# Patient Record
Sex: FEMALE | Race: WHITE | URBAN - METROPOLITAN AREA
[De-identification: names, ages, dates, MRNs, and addresses within clinical notes are randomized per-mention and may not be internally consistent; named-entity substitution may affect disease eponyms.]

---

## 2019-05-25 ENCOUNTER — OFFICE VISIT (OUTPATIENT)
Dept: URGENT CARE | Facility: URGENT CARE | Age: 39
End: 2019-05-25
Payer: COMMERCIAL

## 2019-05-25 VITALS
OXYGEN SATURATION: 95 % | HEART RATE: 99 BPM | RESPIRATION RATE: 16 BRPM | TEMPERATURE: 99.3 F | SYSTOLIC BLOOD PRESSURE: 110 MMHG | DIASTOLIC BLOOD PRESSURE: 76 MMHG | WEIGHT: 290.1 LBS

## 2019-05-25 DIAGNOSIS — H66.91 RIGHT ACUTE OTITIS MEDIA: ICD-10-CM

## 2019-05-25 DIAGNOSIS — J00 ACUTE RHINITIS: ICD-10-CM

## 2019-05-25 DIAGNOSIS — R07.0 THROAT PAIN: Primary | ICD-10-CM

## 2019-05-25 DIAGNOSIS — R05.9 COUGH: ICD-10-CM

## 2019-05-25 LAB
DEPRECATED S PYO AG THROAT QL EIA: NORMAL
SPECIMEN SOURCE: NORMAL

## 2019-05-25 PROCEDURE — 99203 OFFICE O/P NEW LOW 30 MIN: CPT | Performed by: FAMILY MEDICINE

## 2019-05-25 PROCEDURE — 87880 STREP A ASSAY W/OPTIC: CPT | Performed by: PHYSICIAN ASSISTANT

## 2019-05-25 PROCEDURE — 87081 CULTURE SCREEN ONLY: CPT | Performed by: PHYSICIAN ASSISTANT

## 2019-05-25 RX ORDER — BENZONATATE 100 MG/1
200 CAPSULE ORAL 3 TIMES DAILY PRN
Qty: 42 CAPSULE | Refills: 3 | Status: SHIPPED | OUTPATIENT
Start: 2019-05-25

## 2019-05-25 RX ORDER — DIPHENHYDRAMINE HCL 25 MG
25 TABLET ORAL EVERY 6 HOURS PRN
COMMUNITY

## 2019-05-25 RX ORDER — CODEINE PHOSPHATE AND GUAIFENESIN 10; 100 MG/5ML; MG/5ML
1-2 SOLUTION ORAL EVERY 6 HOURS PRN
Qty: 120 ML | Refills: 0 | Status: SHIPPED | OUTPATIENT
Start: 2019-05-25

## 2019-05-25 RX ORDER — FLUTICASONE PROPIONATE 50 MCG
2 SPRAY, SUSPENSION (ML) NASAL DAILY
Qty: 18.2 ML | Refills: 3 | Status: SHIPPED | OUTPATIENT
Start: 2019-05-25

## 2019-05-25 RX ORDER — AZITHROMYCIN 250 MG/1
TABLET, FILM COATED ORAL
Qty: 6 TABLET | Refills: 0 | Status: SHIPPED | OUTPATIENT
Start: 2019-05-25

## 2019-05-25 RX ORDER — ACETAMINOPHEN 500 MG
500-1000 TABLET ORAL EVERY 6 HOURS PRN
COMMUNITY

## 2019-05-25 NOTE — PATIENT INSTRUCTIONS
Saline nasal rinses or Neti Pot nasal rinses    Steam, Humidifier for the lungs and for the nose and sinuses    Drink plenty of liquids    follow up with your primary care provider if not better in 10 days.     Go to the emergency room if you develop severe, worsening shortness of breath.

## 2019-05-25 NOTE — PROGRESS NOTES
SUBJECTIVE:   Emily Cade is a 39 year old female presenting with a chief complaint of sore throat (initially severe, knife-like pain at the left side), barky cough (moderate to severe coughing attacks, worse with activity or when trying to sleep, sometimes productive of yellow phlegm), sinus pressure (a little bit of pressure at the left cheek), nasal discharge, soreness at the bilateral anterior neck.    She arrived from [facility] today.  Onset of symptoms was 7 days ago.  Course of illness is worsening..    Severity severe.   Current and Associated symptoms: as listed above.   Treatment measures tried include Tylenol, Benadryl.      Past Medical History:    No major medical problems.     Current Outpatient Medications   Medication Sig Dispense Refill     acetaminophen (TYLENOL) 500 MG tablet Take 500-1,000 mg by mouth every 6 hours as needed for mild pain       diphenhydrAMINE (BENADRYL) 25 MG tablet Take 25 mg by mouth every 6 hours as needed for itching or allergies       Social History     Tobacco Use     Smoking status: Never Smoker     Smokeless tobacco: Never Used   Substance Use Topics     Alcohol use: Not on file   Patient lives in [place] and she is returning to [place] today after arriving from [facility] today.     ROS:  CONSTITUTIONAL:NEGATIVE  for fevers.    ENT/MOUTH: positive for sore throat, sinus pressure.   RESP: positive for cough, chest tightness.      OBJECTIVE:  /76 (BP Location: Right arm, Patient Position: Chair, Cuff Size: Adult Large)   Pulse 99   Temp 99.3  F (37.4  C) (Oral)   Resp 16   Wt 131.6 kg (290 lb 1.6 oz)   SpO2 95%   GENERAL APPEARANCE: healthy, alert and no distress.  No acute respiratory distress.    HENT: TM erythematous right, nasal turbinates erythematous, swollen and oral mucous membranes moist, mild erythema noted  NECK: supple, nontender, no lymphadenopathy  RESP: lungs clear to auscultation - no rales, rhonchi or wheezes  CV: regular rates and rhythm, normal  S1 S2, no murmur noted  SKIN: no suspicious lesions or rashes    LAB:    Results for orders placed or performed in visit on 05/25/19   Strep, Rapid Screen   Result Value Ref Range    Specimen Description Throat     Rapid Strep A Screen       NEGATIVE: No Group A streptococcal antigen detected by immunoassay, await culture report.         ASSESSMENT:  Cough  Right Otitis Media  Acute Rhinitis  Throat Pain    PLAN:  For the Cough:  Rx:  Azithromycin, Tessalon Perles, Cheratussin AC  Go to the emergency room if there is worsening severe shortness of breath.     For the Right Otitis Media:  Rx:  Azithromycin  Tylenol    For the Acute Rhinitis:  Rx:  Flonase  Saline nasal rinses or Neti Pot nasal rinses  Steam, Humidifier.     follow up with the primary care provider if not better in 10 days.     Armando Varma MD

## 2019-05-26 LAB
BACTERIA SPEC CULT: NORMAL
SPECIMEN SOURCE: NORMAL

## 2020-03-02 ENCOUNTER — HEALTH MAINTENANCE LETTER (OUTPATIENT)
Age: 40
End: 2020-03-02

## 2020-12-20 ENCOUNTER — HEALTH MAINTENANCE LETTER (OUTPATIENT)
Age: 40
End: 2020-12-20

## 2021-04-18 ENCOUNTER — HEALTH MAINTENANCE LETTER (OUTPATIENT)
Age: 41
End: 2021-04-18

## 2021-10-03 ENCOUNTER — HEALTH MAINTENANCE LETTER (OUTPATIENT)
Age: 41
End: 2021-10-03

## 2022-05-14 ENCOUNTER — HEALTH MAINTENANCE LETTER (OUTPATIENT)
Age: 42
End: 2022-05-14

## 2022-09-04 ENCOUNTER — HEALTH MAINTENANCE LETTER (OUTPATIENT)
Age: 42
End: 2022-09-04

## 2023-06-03 ENCOUNTER — HEALTH MAINTENANCE LETTER (OUTPATIENT)
Age: 43
End: 2023-06-03

## 2024-02-18 ENCOUNTER — HEALTH MAINTENANCE LETTER (OUTPATIENT)
Age: 44
End: 2024-02-18